# Patient Record
Sex: MALE | Race: WHITE | NOT HISPANIC OR LATINO | Employment: OTHER | URBAN - METROPOLITAN AREA
[De-identification: names, ages, dates, MRNs, and addresses within clinical notes are randomized per-mention and may not be internally consistent; named-entity substitution may affect disease eponyms.]

---

## 2023-09-03 PROBLEM — Z86.79 ATRIAL FIBRILLATION, CURRENTLY IN SINUS RHYTHM: Status: ACTIVE | Noted: 2023-09-03

## 2023-09-03 PROBLEM — B37.81 ESOPHAGEAL CANDIDIASIS (MULTI): Status: ACTIVE | Noted: 2023-09-03

## 2023-09-03 PROBLEM — I05.9 MITRAL VALVE DISORDER: Status: ACTIVE | Noted: 2023-09-03

## 2023-09-03 PROBLEM — K62.5 BRIGHT RED BLOOD PER RECTUM: Status: ACTIVE | Noted: 2023-09-03

## 2023-09-03 PROBLEM — T82.6XXA PROSTHETIC VALVE ENDOCARDITIS (CMS-HCC): Status: ACTIVE | Noted: 2023-09-03

## 2023-09-03 PROBLEM — N40.0 BENIGN ENLARGEMENT OF PROSTATE: Status: ACTIVE | Noted: 2023-09-03

## 2023-09-03 PROBLEM — B99.9 INFECTION: Status: ACTIVE | Noted: 2023-09-03

## 2023-09-03 PROBLEM — I33.0 BACTERIAL ENDOCARDITIS (HHS-HCC): Status: ACTIVE | Noted: 2023-09-03

## 2023-09-03 PROBLEM — H92.01 RIGHT EAR PAIN: Status: ACTIVE | Noted: 2023-09-03

## 2023-09-03 PROBLEM — I44.2 COMPLETE AV BLOCK (MULTI): Status: ACTIVE | Noted: 2023-09-03

## 2023-09-03 PROBLEM — J02.9 SORE THROAT: Status: ACTIVE | Noted: 2023-09-03

## 2023-09-03 PROBLEM — R05.9 COUGH: Status: ACTIVE | Noted: 2023-09-03

## 2023-09-03 PROBLEM — E78.5 HYPERLIPIDEMIA: Status: ACTIVE | Noted: 2023-09-03

## 2023-09-03 PROBLEM — Z95.2 PRESENCE OF PROSTHETIC HEART VALVE: Status: ACTIVE | Noted: 2023-09-03

## 2023-09-03 PROBLEM — I48.91 ATRIAL FIBRILLATION (MULTI): Status: ACTIVE | Noted: 2023-09-03

## 2023-09-03 PROBLEM — R09.89 BRUIT OF LEFT CAROTID ARTERY: Status: ACTIVE | Noted: 2023-09-03

## 2023-09-03 PROBLEM — Z95.0 PRESENCE OF CARDIAC PACEMAKER: Status: ACTIVE | Noted: 2023-09-03

## 2023-09-03 PROBLEM — Z95.2 S/P MVR (MITRAL VALVE REPLACEMENT): Status: ACTIVE | Noted: 2023-09-03

## 2023-09-03 PROBLEM — I38 PROSTHETIC VALVE ENDOCARDITIS (CMS-HCC): Status: ACTIVE | Noted: 2023-09-03

## 2023-09-03 PROBLEM — I33.0 PROSTHETIC VALVE ENDOCARDITIS: Status: ACTIVE | Noted: 2023-09-03

## 2023-09-03 PROBLEM — H61.22 IMPACTED CERUMEN OF LEFT EAR: Status: ACTIVE | Noted: 2023-09-03

## 2023-09-03 PROBLEM — N52.9 ERECTILE DYSFUNCTION: Status: ACTIVE | Noted: 2023-09-03

## 2023-09-03 RX ORDER — TIZANIDINE 2 MG/1
2 TABLET ORAL EVERY 6 HOURS PRN
COMMUNITY
Start: 2023-04-06 | End: 2024-05-20

## 2023-09-03 RX ORDER — FERROUS SULFATE 325(65) MG
325 TABLET ORAL
COMMUNITY
Start: 2021-02-22 | End: 2024-05-20

## 2023-09-03 RX ORDER — ACETAMINOPHEN 500 MG
TABLET ORAL
COMMUNITY
Start: 2022-10-04

## 2023-09-03 RX ORDER — WARFARIN 4 MG/1
TABLET ORAL
COMMUNITY
Start: 2021-02-03 | End: 2024-05-20

## 2023-09-03 RX ORDER — MULTIVIT-MIN/IRON FUM/FOLIC AC 7.5 MG-4
1 TABLET ORAL DAILY
COMMUNITY

## 2023-09-03 RX ORDER — ACETAMINOPHEN 325 MG/1
TABLET ORAL
COMMUNITY
End: 2024-05-20

## 2023-09-03 RX ORDER — ATORVASTATIN CALCIUM 20 MG/1
TABLET, FILM COATED ORAL
COMMUNITY
Start: 2022-08-31 | End: 2024-05-20

## 2023-11-09 ENCOUNTER — APPOINTMENT (OUTPATIENT)
Dept: SLEEP MEDICINE | Facility: HOSPITAL | Age: 68
End: 2023-11-09
Payer: MEDICARE

## 2023-12-05 ENCOUNTER — APPOINTMENT (OUTPATIENT)
Dept: SLEEP MEDICINE | Facility: HOSPITAL | Age: 68
End: 2023-12-05
Payer: COMMERCIAL

## 2023-12-05 ENCOUNTER — TELEPHONE (OUTPATIENT)
Dept: SLEEP MEDICINE | Facility: CLINIC | Age: 68
End: 2023-12-05
Payer: MEDICARE

## 2023-12-05 NOTE — TELEPHONE ENCOUNTER
krystle MONGE at [facility] the patient cancelled his appt [place] he has been rescheduled for May 2024

## 2024-04-11 ENCOUNTER — TELEPHONE (OUTPATIENT)
Dept: CARDIOLOGY | Facility: CLINIC | Age: 69
End: 2024-04-11
Payer: MEDICARE

## 2024-04-11 NOTE — TELEPHONE ENCOUNTER
Patients wife called the office today to see if you need lab work for Jose before he comes in for his appointment on 5/20?  There were no orders in his chart.  Please advise, thanks  Call back [phone]    Called patient with response.

## 2024-04-18 ENCOUNTER — TELEPHONE (OUTPATIENT)
Dept: SLEEP MEDICINE | Facility: CLINIC | Age: 69
End: 2024-04-18
Payer: MEDICARE

## 2024-04-18 DIAGNOSIS — G47.30 SLEEP APNEA, UNSPECIFIED TYPE: ICD-10-CM

## 2024-04-18 DIAGNOSIS — I38 PROSTHETIC VALVE ENDOCARDITIS, SEQUELA: ICD-10-CM

## 2024-04-18 DIAGNOSIS — T82.6XXS PROSTHETIC VALVE ENDOCARDITIS, SEQUELA: ICD-10-CM

## 2024-04-18 DIAGNOSIS — Z86.79 ATRIAL FIBRILLATION, CURRENTLY IN SINUS RHYTHM: Primary | ICD-10-CM

## 2024-04-18 NOTE — TELEPHONE ENCOUNTER
----- Message from Erica Hayden sent at 4/18/2024  7:49 AM EDT -----  Regarding: Sleep Study Order  This patient was scheduled in December for a sleep study on May 8, 2024  His order expires April 3, 2024.  Would you please place a new order.

## 2024-05-06 ENCOUNTER — APPOINTMENT (OUTPATIENT)
Dept: CARDIOLOGY | Facility: CLINIC | Age: 69
End: 2024-05-06
Payer: COMMERCIAL

## 2024-05-08 ENCOUNTER — APPOINTMENT (OUTPATIENT)
Dept: SLEEP MEDICINE | Facility: HOSPITAL | Age: 69
End: 2024-05-08
Payer: COMMERCIAL

## 2024-05-08 ENCOUNTER — CLINICAL SUPPORT (OUTPATIENT)
Dept: SLEEP MEDICINE | Facility: HOSPITAL | Age: 69
End: 2024-05-08
Payer: COMMERCIAL

## 2024-05-08 DIAGNOSIS — T82.6XXS PROSTHETIC VALVE ENDOCARDITIS, SEQUELA: ICD-10-CM

## 2024-05-08 DIAGNOSIS — G47.30 SLEEP APNEA, UNSPECIFIED TYPE: ICD-10-CM

## 2024-05-08 DIAGNOSIS — I38 PROSTHETIC VALVE ENDOCARDITIS, SEQUELA: ICD-10-CM

## 2024-05-08 DIAGNOSIS — G47.61 PERIODIC LIMB MOVEMENT DISORDER: ICD-10-CM

## 2024-05-08 DIAGNOSIS — Z86.79 ATRIAL FIBRILLATION, CURRENTLY IN SINUS RHYTHM: ICD-10-CM

## 2024-05-08 DIAGNOSIS — G47.33 OBSTRUCTIVE SLEEP APNEA (ADULT) (PEDIATRIC): ICD-10-CM

## 2024-05-08 PROCEDURE — 95810 POLYSOM 6/> YRS 4/> PARAM: CPT | Performed by: INTERNAL MEDICINE

## 2024-05-09 VITALS — HEART RATE: 50 BPM | RESPIRATION RATE: 14 BRPM | OXYGEN SATURATION: 98 %

## 2024-05-09 ASSESSMENT — SLEEP AND FATIGUE QUESTIONNAIRES
HOW LIKELY ARE YOU TO NOD OFF OR FALL ASLEEP WHILE WATCHING TV: MODERATE CHANCE OF DOZING
HOW LIKELY ARE YOU TO NOD OFF OR FALL ASLEEP WHILE SITTING QUIETLY AFTER LUNCH WITHOUT ALCOHOL: MODERATE CHANCE OF DOZING
HOW LIKELY ARE YOU TO NOD OFF OR FALL ASLEEP IN A CAR, WHILE STOPPED FOR A FEW MINUTES IN TRAFFIC: WOULD NEVER DOZE
HOW LIKELY ARE YOU TO NOD OFF OR FALL ASLEEP WHILE SITTING AND TALKING TO SOMEONE: WOULD NEVER DOZE
HOW LIKELY ARE YOU TO NOD OFF OR FALL ASLEEP WHILE LYING DOWN TO REST IN THE AFTERNOON WHEN CIRCUMSTANCES PERMIT: MODERATE CHANCE OF DOZING
HOW LIKELY ARE YOU TO NOD OFF OR FALL ASLEEP WHILE SITTING AND READING: WOULD NEVER DOZE
HOW LIKELY ARE YOU TO NOD OFF OR FALL ASLEEP WHEN YOU ARE A PASSENGER IN A CAR FOR AN HOUR WITHOUT A BREAK: WOULD NEVER DOZE
SITING INACTIVE IN A PUBLIC PLACE LIKE A CLASS ROOM OR A MOVIE THEATER: WOULD NEVER DOZE
ESS-CHAD TOTAL SCORE: 6

## 2024-05-16 NOTE — PROGRESS NOTES
[facility]  Followup Visit Note    HISTORY OF PRESENT ILLNESS   Jose Ann is a 69 y.o. male who presents to a [facility] for followup.       Current History    On today's visit, the patient reports ***      2023 (Giovani Pineda MD )  Obstructive Sleep Apnea:   Obstructive sleep apnea occurs as repetitive episodes of partial or complete blockage of the upper air way during sleep. During an episode, the muscles work harder to re-open the airway and blood pressure may go up.   Breathing usually resumes with a snort, loud gasp or body movement. Sleep apnea episode can interfere with getting quality sleep, cause heart rhythm problems, increases in blood pressure and reduce the flow of oxygen to vital organs such as the brain.   A sleep study confirms the diagnosis and tells us how many times you stop breathing in your sleep. Treatment aims to re-open the airway, with the most common being Positive Airway Pressure (PAP) delivered with a soft interface.   Other treatments include weight loss to remove excessive tissue around the neck, dental appliance, position (staying off the back) and surgery. Avoidance of sedatives such as alcohol, can help avoid worsening symptoms.   Adult Sleep Contact Information:   ** TO SCHEDULE APPOINTMENTS: Adult Central [phone].   ** FOR ALL OTHER SCHEDULING NEEDS (doctor offices, sleep study, after hours sleep study line): [phone])   Follow-Up:   Follow-Up Visit in 2 - 3 weeks after testing.     PAP Adherence:  Durable Medical Equipment Company: ***  Pressure Range: *** cm H2O Mask: ***    A PAP adherence download was obtained and data was reviewed personally today in clinic. (see scanned document in EPIC)  % days >4 hours use: ***  Average use : ***  95% pressure *** cm H2O  95% leak : *** L/min  AHI: ***    Sleep Scales:  ESS: 6     REVIEW OF SYSTEMS    All other systems  "negative      PHYSICAL EXAM     VITAL SIGNS: There were no vitals taken for this visit.     CURRENT WEIGHT: [unfilled]  BMI: [unfilled]  PREVIOUS WEIGHTS:  Wt Readings from Last 3 Encounters:   08/23/23 88.5 kg (195 lb)   06/08/23 92.5 kg (204 lb)   10/04/22 89.6 kg (197 lb 7 oz)       Constitutional: Alert and oriented, cooperative, no obvious distress   HEENT: Non icteric or anemic, EOM WNL bilaterally   Neck: Supple, no JVD, no goiter, no adenopathy, no rigidity  Extremities: No clubbing, no LL edema   Neuromuscular: Cranial nerves grossly intact, no focal deficits     RESULTS/DATA     No results found for: \"IRON\", \"TRANSFERRIN\", \"IRONSAT\", \"TIBC\", \"FERRITIN\"      ASSESSMENT/PLAN     Mr. Ann is a 69 y.o. male and returns in followup for the following issues:    ***    Follow up          "

## 2024-05-17 ENCOUNTER — APPOINTMENT (OUTPATIENT)
Dept: SLEEP MEDICINE | Facility: CLINIC | Age: 69
End: 2024-05-17
Payer: MEDICARE

## 2024-05-19 NOTE — PROGRESS NOTES
Subjective      Chief Complaint   Patient presents with    Follow-up          He has a history of mitral valve surgery in 2012 and 2020 he had COVID which did affect the valve which had to have repeat surgery for replacement.  In 2012 he had A-fib ablation.  Last catheterization in 2020 show the coronary arteries were normal.  He had been on statins in the past and had severe muscle aches.  He did have a cardiac calcium score in September of 2023 and it was 151.  Is active and is still working.  He is not complaining of chest discomfort.  NO PND or orthopnea.  The legs are not swelling on him.  He does not complain of palpitations.  Will have varicose veins injected.  The BP is doing well  Pacer is doing well  The last echo was last year and the valve is doing well           Review of Systems   Constitutional: Positive for fever. Negative for chills.   HENT: Negative.     Eyes: Negative.    Respiratory: Negative.  Negative for cough.    Endocrine: Negative.    Skin: Negative.    Musculoskeletal:  Positive for back pain and joint pain. Negative for falls.   Gastrointestinal: Negative.    Genitourinary: Negative.    Neurological: Negative.    All other systems reviewed and are negative.       Past Surgical History:   Procedure Laterality Date    OTHER SURGICAL HISTORY  06/04/2013    Isolation Of Intestinal Segment Large Intestine    OTHER SURGICAL HISTORY  08/20/2013    Maze Procedure    OTHER SURGICAL HISTORY  08/20/2013    Patent Foramen Ovale Closure    OTHER SURGICAL HISTORY  08/20/2013    Tricuspid Valve Repair    OTHER SURGICAL HISTORY  08/20/2013    Percutaneous Transcatheter Closure LA Appendage With Implant    OTHER SURGICAL HISTORY  08/20/2013    Mitral Valve Repair    OTHER SURGICAL HISTORY  08/20/2013    Treatment Of A Dislocated Toe IP Right    SMALL INTESTINE SURGERY  08/20/2013    Small Bowel Resection        Active Ambulatory Problems     Diagnosis Date Noted    Atrial fibrillation (Multi) 09/03/2023     Atrial fibrillation, currently in sinus rhythm 09/03/2023    Bacterial endocarditis ([facility]) 09/03/2023    Benign enlargement of prostate 09/03/2023    Bright red blood per rectum 09/03/2023    Bruit of left carotid artery 09/03/2023    Complete AV block (Multi) 09/03/2023    Cough 09/03/2023    Erectile dysfunction 09/03/2023    Esophageal candidiasis (Multi) 09/03/2023    Hyperlipidemia 09/03/2023    Impacted cerumen of left ear 09/03/2023    Infection 09/03/2023    Mitral valve disorder 09/03/2023    Presence of cardiac pacemaker 09/03/2023    Presence of prosthetic heart valve 09/03/2023    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   Eyes:      Pupils: Pupils are equal, round, and reactive to light.   Neck:      Vascular: JVD normal.   Pulmonary:      Breath sounds: Normal breath sounds.   Cardiovascular:      PMI at left midclavicular line. Normal rate. Regular rhythm. Normal S1. Normal S2.       Murmurs: There is a grade 2/6 holosystolic murmur.      No gallop.  No click. No rub.   Pulses:     Intact distal pulses.   Edema:     Peripheral edema absent.   Abdominal:      Palpations: Abdomen is soft.      Tenderness: There is no abdominal tenderness.   Musculoskeletal:      Extremities: No clubbing present.Skin:     General: Skin is warm and dry.   Neurological:      General: No focal deficit present.            Lab Review:         Lab Results   Component Value Date    CHOL  "204 (H) 12/02/2022    CHOL 184 08/29/2022    CHOL 189 12/08/2021     Lab Results   Component Value Date    HDL 43.5 12/02/2022    HDL 40.3 08/29/2022    HDL 40.1 12/08/2021     Lab Results   Component Value Date    LDLCALC 116 07/12/2019     Lab Results   Component Value Date    TRIG 57 12/02/2022    TRIG 52 08/29/2022    TRIG 61 12/08/2021     No components found for: \"CHOLHDL\"     Assessment/Plan     Mitral valve disorder  The valve sounds the same.  No angina and no chf.    Atrial fibrillation, currently in sinus rhythm  Remained in the NSR     "

## 2024-05-20 ENCOUNTER — OFFICE VISIT (OUTPATIENT)
Dept: CARDIOLOGY | Facility: CLINIC | Age: 69
End: 2024-05-20
Payer: MEDICARE

## 2024-05-20 VITALS
WEIGHT: 204 LBS | HEART RATE: 54 BPM | SYSTOLIC BLOOD PRESSURE: 138 MMHG | OXYGEN SATURATION: 97 % | BODY MASS INDEX: 28.45 KG/M2 | DIASTOLIC BLOOD PRESSURE: 61 MMHG

## 2024-05-20 DIAGNOSIS — I05.9 MITRAL VALVE DISORDER: Primary | ICD-10-CM

## 2024-05-20 DIAGNOSIS — Z86.79 ATRIAL FIBRILLATION, CURRENTLY IN SINUS RHYTHM: ICD-10-CM

## 2024-05-20 PROCEDURE — 99213 OFFICE O/P EST LOW 20 MIN: CPT | Performed by: INTERNAL MEDICINE

## 2024-05-20 PROCEDURE — 1036F TOBACCO NON-USER: CPT | Performed by: INTERNAL MEDICINE

## 2024-05-20 PROCEDURE — 1126F AMNT PAIN NOTED NONE PRSNT: CPT | Performed by: INTERNAL MEDICINE

## 2024-05-20 PROCEDURE — 1159F MED LIST DOCD IN RCRD: CPT | Performed by: INTERNAL MEDICINE

## 2024-05-20 ASSESSMENT — ENCOUNTER SYMPTOMS
LOSS OF SENSATION IN FEET: 0
EYES NEGATIVE: 1
OCCASIONAL FEELINGS OF UNSTEADINESS: 0
COUGH: 0
FALLS: 0
FEVER: 1
NEUROLOGICAL NEGATIVE: 1
BACK PAIN: 1
ENDOCRINE NEGATIVE: 1
CHILLS: 0
RESPIRATORY NEGATIVE: 1
DEPRESSION: 0
GASTROINTESTINAL NEGATIVE: 1

## 2024-05-20 ASSESSMENT — PATIENT HEALTH QUESTIONNAIRE - PHQ9
1. LITTLE INTEREST OR PLEASURE IN DOING THINGS: NOT AT ALL
SUM OF ALL RESPONSES TO PHQ9 QUESTIONS 1 AND 2: 0
2. FEELING DOWN, DEPRESSED OR HOPELESS: NOT AT ALL

## 2024-05-20 ASSESSMENT — PAIN SCALES - GENERAL: PAINLEVEL: 0-NO PAIN

## 2024-05-30 ENCOUNTER — LAB (OUTPATIENT)
Dept: LAB | Facility: LAB | Age: 69
End: 2024-05-30
Payer: MEDICARE

## 2024-05-30 ENCOUNTER — OFFICE VISIT (OUTPATIENT)
Dept: SLEEP MEDICINE | Facility: CLINIC | Age: 69
End: 2024-05-30
Payer: MEDICARE

## 2024-05-30 VITALS
WEIGHT: 200 LBS | DIASTOLIC BLOOD PRESSURE: 74 MMHG | HEIGHT: 71 IN | HEART RATE: 53 BPM | BODY MASS INDEX: 28 KG/M2 | OXYGEN SATURATION: 98 % | SYSTOLIC BLOOD PRESSURE: 110 MMHG

## 2024-05-30 DIAGNOSIS — E83.10 DISORDER OF IRON METABOLISM: ICD-10-CM

## 2024-05-30 DIAGNOSIS — G47.33 OBSTRUCTIVE SLEEP APNEA (ADULT) (PEDIATRIC): Primary | ICD-10-CM

## 2024-05-30 DIAGNOSIS — G47.61 PERIODIC LIMB MOVEMENT DISORDER: ICD-10-CM

## 2024-05-30 LAB
FERRITIN SERPL-MCNC: 117 NG/ML (ref 20–300)
IRON SATN MFR SERPL: 29 % (ref 25–45)
IRON SERPL-MCNC: 115 UG/DL (ref 35–150)
TIBC SERPL-MCNC: 397 UG/DL (ref 240–445)
UIBC SERPL-MCNC: 282 UG/DL (ref 110–370)

## 2024-05-30 PROCEDURE — 99214 OFFICE O/P EST MOD 30 MIN: CPT | Performed by: INTERNAL MEDICINE

## 2024-05-30 PROCEDURE — 1159F MED LIST DOCD IN RCRD: CPT | Performed by: INTERNAL MEDICINE

## 2024-05-30 PROCEDURE — 83540 ASSAY OF IRON: CPT

## 2024-05-30 PROCEDURE — 83550 IRON BINDING TEST: CPT

## 2024-05-30 PROCEDURE — 1126F AMNT PAIN NOTED NONE PRSNT: CPT | Performed by: INTERNAL MEDICINE

## 2024-05-30 PROCEDURE — 36415 COLL VENOUS BLD VENIPUNCTURE: CPT

## 2024-05-30 PROCEDURE — 1160F RVW MEDS BY RX/DR IN RCRD: CPT | Performed by: INTERNAL MEDICINE

## 2024-05-30 PROCEDURE — 1036F TOBACCO NON-USER: CPT | Performed by: INTERNAL MEDICINE

## 2024-05-30 PROCEDURE — 82728 ASSAY OF FERRITIN: CPT

## 2024-05-30 RX ORDER — GLUCOSAM/CHONDRO/HERB 149/HYAL 750-100 MG
TABLET ORAL
COMMUNITY

## 2024-05-30 ASSESSMENT — SLEEP AND FATIGUE QUESTIONNAIRES
HOW LIKELY ARE YOU TO NOD OFF OR FALL ASLEEP WHEN YOU ARE A PASSENGER IN A CAR FOR AN HOUR WITHOUT A BREAK: MODERATE CHANCE OF DOZING
HOW LIKELY ARE YOU TO NOD OFF OR FALL ASLEEP WHILE LYING DOWN TO REST IN THE AFTERNOON WHEN CIRCUMSTANCES PERMIT: HIGH CHANCE OF DOZING
HOW LIKELY ARE YOU TO NOD OFF OR FALL ASLEEP WHILE WATCHING TV: HIGH CHANCE OF DOZING
HOW LIKELY ARE YOU TO NOD OFF OR FALL ASLEEP WHILE SITTING QUIETLY AFTER LUNCH WITHOUT ALCOHOL: HIGH CHANCE OF DOZING
ESS-CHAD TOTAL SCORE: 18
HOW LIKELY ARE YOU TO NOD OFF OR FALL ASLEEP IN A CAR, WHILE STOPPED FOR A FEW MINUTES IN TRAFFIC: WOULD NEVER DOZE
SITING INACTIVE IN A PUBLIC PLACE LIKE A CLASS ROOM OR A MOVIE THEATER: MODERATE CHANCE OF DOZING
HOW LIKELY ARE YOU TO NOD OFF OR FALL ASLEEP WHILE SITTING AND TALKING TO SOMEONE: MODERATE CHANCE OF DOZING
HOW LIKELY ARE YOU TO NOD OFF OR FALL ASLEEP WHILE SITTING AND READING: HIGH CHANCE OF DOZING

## 2024-05-30 ASSESSMENT — PAIN SCALES - GENERAL: PAINLEVEL: 0-NO PAIN

## 2024-05-30 NOTE — ASSESSMENT & PLAN NOTE
We will check your ferritin level (a measure of iron stores) and start iron supplementation x 4-6 months if your ferritin level is under 75.

## 2024-05-30 NOTE — ASSESSMENT & PLAN NOTE
- The patient has sleep apnea and requires treatment.  - Start  Auto-PAP 5-15 cm H20 through "[employer].  - Sleep apnea and PAP therapy education was provided at length in clinic today. Jose  verbalized understanding.  -Jose verbalized understanding.

## 2024-05-30 NOTE — PROGRESS NOTES
Patient: Jose Ann    [id number]  : 1955 -- AGE 69 y.o.    Provider: Giovani Pineda MD     Location [facility]   Service Date: 2024              [facility]  Followup Visit Note        HISTORY OF PRESENT ILLNESS     The patient's referring provider is: Casey Ayala MD     HISTORY OF PRESENT ILLNESS   Jose Ann is a 69 y.o. male who presents to a [facility] for followup.     The patient  has a past medical history of Hyperlipidemia, Pacemaker, and S/P MVR (mitral valve repair)..    PAST SLEEP HISTORY  Suspect for SAIGE- ordered sleep study  Possible RLS    CURRENT HISTORY    On today's visit, the patient presents to review the results of his sleep study on 2024: PSG RDI 3% 29, RDI 4% 16.9, CHUCKIE 1.2, SpO2 archie 85%.  PLMI 61.5 with 2.8% associated with arousals.  Periodic limb movement arousal index 1.7.  Paced cardiac rhythm     RLS Followup:   Still has symptoms of RLS  Undergoing vein treatment    ESS: 18         REVIEW OF SYSTEMS     REVIEW OF SYSTEMS  Review of Systems      ALLERGIES AND MEDICATIONS     ALLERGIES  Allergies   Allergen Reactions    Ondansetron Hcl Unknown    Piperacillin-Tazobactam Unknown    Sulfamethoxazole-Trimethoprim Unknown     patient does not recall the reaction       MEDICATIONS: He has a current medication list which includes the following prescription(s): acetaminophen - 2 tablets by mouth  2 times daily as needed, multivit-min-iron fum-folic ac - Take 1 tablet by mouth once daily, and omega 3-dha-epa-fish oil - Take by mouth.    PAST MEDICAL HISTORY : He  has a past medical history of Hyperlipidemia, Pacemaker, and S/P MVR (mitral valve repair).    PAST SURGICAL HISTORY: He  has a past surgical history that includes Other surgical history (2013); Other surgical history (2013); Other surgical history (2013); Other surgical history (2013);  "Other surgical history (08/20/2013); Other surgical history (08/20/2013); Small intestine surgery (08/20/2013); and Other surgical history (08/20/2013).     FAMILY HISTORY: No changes since previous visit. Otherwise non-contributory as charted.       SOCIAL HISTORY  He  reports that he quit smoking about 4 years ago. His smoking use included cigarettes. He has never used smokeless tobacco. He reports current alcohol use. He reports that he does not use drugs.       PHYSICAL EXAM     VITAL SIGNS: /74   Pulse 53   Ht 1.803 m (5' 11\")   Wt 90.7 kg (200 lb)   SpO2 98%   BMI 27.89 kg/m²      CURRENT WEIGHT: [unfilled]  BMI: [unfilled]  PREVIOUS WEIGHTS:  Wt Readings from Last 3 Encounters:   05/30/24 90.7 kg (200 lb)   05/20/24 92.5 kg (204 lb)   08/23/23 88.5 kg (195 lb)       Physical Exam  PHYSICAL EXAM: GENERAL: alert pleasant and cooperative no acute distress  PSYCH EXAM: alert,oriented, in NAD with a full range of affect, normal behavior and no psychotic features      RESULTS/DATA     No results found for: \"IRON\", \"TRANSFERRIN\", \"IRONSAT\", \"TIBC\", \"FERRITIN\"    ASSESSMENT/PLAN     Mr. Ann is a 69 y.o. male and  has a past medical history of Hyperlipidemia, Pacemaker, and S/P MVR (mitral valve repair). He returns in followup to the [facility] for their sleep study results.    Problem List and Orders  Problem List Items Addressed This Visit             ICD-10-CM    Obstructive sleep apnea (adult) (pediatric) - Primary G47.33     - The patient has sleep apnea and requires treatment.  - Start  Auto-PAP 5-15 cm H20 through [employer].  - Sleep apnea and PAP therapy education was provided at length in clinic today. Jose  verbalized understanding.  -Jose verbalized understanding.          Relevant Orders    Positive Airway Pressure (PAP) Therapy    Periodic limb movement disorder G47.61     We will check your ferritin level (a measure of iron stores) and start iron " supplementation x 4-6 months if your ferritin level is under 75.           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

## 2024-05-30 NOTE — PATIENT INSTRUCTIONS
"  Starting Positive Airway Pressure: You were ordered a device to wear when you sleep called PAP (Positive Airway Pressure) to treat your sleep apnea. The order will be submitted to a durable medical equipment company who will arrange setting you up with the device. They will provide all the necessary equipment and discuss use and maintenance of the device with you.     Please followup with us in 1-2 months of starting PAP to see how well it is working for you or to troubleshoot. Please bring your equipment to this initial followup visit.    **Please bring all PAP equipment with you to follow up appointments unless told otherwise.**     Important things to keep in mind as you start PAP:  Insurance will monitor your usage during the first 90 days.  You should use your PAP - \"all night, every night\", for your health. The bare minimum is to use your PAP device while sleeping = at least 4 hours per day at least 5 days per week. Otherwise, your PAP device may be reclaimed by your PAP vendor at 90 days.  There are many mask to choose from to wear with your PAP machine. If you are not comfortable with the first mask issued to you, call your DME and ask for another option to try. Some have a 30 day return policy.  Discuss with your provider if you are having issues breathing with the machine or the temperature or humidity feel uncomfortable  Expect to have an adjustment period when you start your device. It helps to continuing wearing the machine every day for a period of time until you get more used to it. You can practice with wearing the mask alone if you need, then add in the PAP air pressure a few days later.   Reach out for help if you are struggling! The sleep medicine department can be reached at [phone]  We encourage you to download data monitoring apps to your phone. For [employer] 10/11 - MyAir tejas. For [employer] - DreamMapper. Both are available in the [employer] store for free and are a great " tool to monitor your progress with your CPAP night to night.

## 2024-06-20 ENCOUNTER — TELEPHONE (OUTPATIENT)
Dept: SLEEP MEDICINE | Facility: HOSPITAL | Age: 69
End: 2024-06-20
Payer: MEDICARE

## 2024-06-20 NOTE — TELEPHONE ENCOUNTER
Left message for pt regarding lab results. Advised to follow up with questions or concerns.

## 2024-07-25 ENCOUNTER — APPOINTMENT (OUTPATIENT)
Dept: SLEEP MEDICINE | Facility: CLINIC | Age: 69
End: 2024-07-25
Payer: MEDICARE

## 2024-08-28 ENCOUNTER — OFFICE VISIT (OUTPATIENT)
Dept: SLEEP MEDICINE | Facility: CLINIC | Age: 69
End: 2024-08-28
Payer: MEDICARE

## 2024-08-28 VITALS
WEIGHT: 189 LBS | DIASTOLIC BLOOD PRESSURE: 72 MMHG | OXYGEN SATURATION: 98 % | HEIGHT: 71 IN | HEART RATE: 54 BPM | BODY MASS INDEX: 26.46 KG/M2 | SYSTOLIC BLOOD PRESSURE: 120 MMHG

## 2024-08-28 DIAGNOSIS — G47.33 OBSTRUCTIVE SLEEP APNEA (ADULT) (PEDIATRIC): Primary | ICD-10-CM

## 2024-08-28 DIAGNOSIS — G47.61 PERIODIC LIMB MOVEMENT DISORDER: ICD-10-CM

## 2024-08-28 PROCEDURE — 3008F BODY MASS INDEX DOCD: CPT | Performed by: INTERNAL MEDICINE

## 2024-08-28 PROCEDURE — 1159F MED LIST DOCD IN RCRD: CPT | Performed by: INTERNAL MEDICINE

## 2024-08-28 PROCEDURE — 99214 OFFICE O/P EST MOD 30 MIN: CPT | Performed by: INTERNAL MEDICINE

## 2024-08-28 PROCEDURE — 1126F AMNT PAIN NOTED NONE PRSNT: CPT | Performed by: INTERNAL MEDICINE

## 2024-08-28 PROCEDURE — 1160F RVW MEDS BY RX/DR IN RCRD: CPT | Performed by: INTERNAL MEDICINE

## 2024-08-28 PROCEDURE — 1036F TOBACCO NON-USER: CPT | Performed by: INTERNAL MEDICINE

## 2024-08-28 ASSESSMENT — PAIN SCALES - GENERAL: PAINLEVEL: 0-NO PAIN

## 2024-08-28 ASSESSMENT — SLEEP AND FATIGUE QUESTIONNAIRES
ESS-CHAD TOTAL SCORE: 6
HOW LIKELY ARE YOU TO NOD OFF OR FALL ASLEEP WHILE WATCHING TV: SLIGHT CHANCE OF DOZING
HOW LIKELY ARE YOU TO NOD OFF OR FALL ASLEEP WHILE LYING DOWN TO REST IN THE AFTERNOON WHEN CIRCUMSTANCES PERMIT: MODERATE CHANCE OF DOZING
HOW LIKELY ARE YOU TO NOD OFF OR FALL ASLEEP WHEN YOU ARE A PASSENGER IN A CAR FOR AN HOUR WITHOUT A BREAK: WOULD NEVER DOZE
SITING INACTIVE IN A PUBLIC PLACE LIKE A CLASS ROOM OR A MOVIE THEATER: WOULD NEVER DOZE
HOW LIKELY ARE YOU TO NOD OFF OR FALL ASLEEP WHILE SITTING QUIETLY AFTER LUNCH WITHOUT ALCOHOL: MODERATE CHANCE OF DOZING
HOW LIKELY ARE YOU TO NOD OFF OR FALL ASLEEP WHILE SITTING AND TALKING TO SOMEONE: WOULD NEVER DOZE
HOW LIKELY ARE YOU TO NOD OFF OR FALL ASLEEP WHILE SITTING AND READING: SLIGHT CHANCE OF DOZING
HOW LIKELY ARE YOU TO NOD OFF OR FALL ASLEEP IN A CAR, WHILE STOPPED FOR A FEW MINUTES IN TRAFFIC: WOULD NEVER DOZE

## 2024-08-28 NOTE — ASSESSMENT & PLAN NOTE
- Jose Ann  has sleep apnea and requires treatment.  - Jose Ann demonstrates good compliance and benefit from PAP therapy  - Continue Auto PAP 5-15 cmH20   - Order for renewal of PAP supplies placed through [employer]  - Follow-up in 12 months

## 2024-08-28 NOTE — PROGRESS NOTES
"Subjective   Patient ID: Jose Ann is a 69 y.o. male who presents for Sleep Apnea and Pap Adherence Followup.  HPI    Prior Sleep History:  5/8/2024: PSG RDI 3% 29, RDI 4% 16.9, CHUCKIE 1.2, SpO2 archie 85%. PLMI 61.5 with 2.8% associated with arousals. Periodic limb movement arousal index 1.7. Paced cardiac rhythm     Current Sleep History:      A downloaded compliance report was reviewed and was interpreted by myself as follows:  > 4 hour compliance was 100 %, with an average use of  6 hours and 16 minutes, with a residual AHI 1.7 .   He reports that he is sleeping well.   Jose Ann reports  good benefit from his device.  Wears a nasal pillows mask    ESS: 6     Review of Systems  Review of systems negative except as per HPI  Objective   /72   Pulse 54   Ht 1.803 m (5' 11\")   Wt 85.7 kg (189 lb)   SpO2 98%   BMI 26.36 kg/m²    PREVIOUS WEIGHTS:  Wt Readings from Last 3 Encounters:   08/28/24 85.7 kg (189 lb)   05/30/24 90.7 kg (200 lb)   05/20/24 92.5 kg (204 lb)       Physical Exam  PHYSICAL EXAM: GENERAL: alert pleasant and cooperative no acute distress  PSYCH EXAM: alert,oriented, in NAD with a full range of affect, normal behavior and no psychotic features    Lab Results   Component Value Date    IRON 115 05/30/2024    TIBC 397 05/30/2024    FERRITIN 117 05/30/2024        Assessment/Plan   Problem List Items Addressed This Visit             ICD-10-CM    Obstructive sleep apnea (adult) (pediatric) - Primary G47.33     - Jose Ann  has sleep apnea and requires treatment.  - Jose Ann demonstrates good compliance and benefit from PAP therapy  - Continue Auto PAP 5-15 cmH20   - Order for renewal of PAP supplies placed through [employer]  - Follow-up in 12 months          Periodic limb movement disorder G47.61     Has not been problematic. Doing well                 "

## 2024-08-29 ENCOUNTER — APPOINTMENT (OUTPATIENT)
Dept: SLEEP MEDICINE | Facility: CLINIC | Age: 69
End: 2024-08-29
Payer: MEDICARE

## 2024-09-03 ENCOUNTER — HOSPITAL ENCOUNTER (OUTPATIENT)
Dept: RADIOLOGY | Facility: HOSPITAL | Age: 69
Discharge: HOME | End: 2024-09-03
Payer: MEDICARE

## 2024-09-03 DIAGNOSIS — R52 PAIN: ICD-10-CM

## 2024-09-03 DIAGNOSIS — S01.81XD LACERATION WITHOUT FOREIGN BODY OF OTHER PART OF HEAD, SUBSEQUENT ENCOUNTER: ICD-10-CM

## 2024-09-03 DIAGNOSIS — S09.90XD UNSPECIFIED INJURY OF HEAD, SUBSEQUENT ENCOUNTER: ICD-10-CM

## 2024-09-03 PROCEDURE — 76377 3D RENDER W/INTRP POSTPROCES: CPT

## 2024-09-03 PROCEDURE — 70486 CT MAXILLOFACIAL W/O DYE: CPT

## 2024-09-03 PROCEDURE — 70450 CT HEAD/BRAIN W/O DYE: CPT | Performed by: RADIOLOGY

## 2024-09-03 PROCEDURE — 70450 CT HEAD/BRAIN W/O DYE: CPT

## 2024-09-30 ENCOUNTER — TELEPHONE (OUTPATIENT)
Dept: CARDIOLOGY | Facility: CLINIC | Age: 69
End: 2024-09-30
Payer: MEDICARE

## 2024-10-03 ENCOUNTER — APPOINTMENT (OUTPATIENT)
Dept: CARDIOLOGY | Facility: CLINIC | Age: 69
End: 2024-10-03
Payer: MEDICARE

## 2024-11-01 ENCOUNTER — HOSPITAL ENCOUNTER (OUTPATIENT)
Dept: CARDIOLOGY | Facility: CLINIC | Age: 69
Discharge: HOME | End: 2024-11-01
Payer: MEDICARE

## 2024-11-01 DIAGNOSIS — I44.2 COMPLETE HEART BLOCK: ICD-10-CM

## 2024-11-01 PROCEDURE — 93280 PM DEVICE PROGR EVAL DUAL: CPT

## 2024-11-08 ENCOUNTER — HOSPITAL ENCOUNTER (OUTPATIENT)
Dept: CARDIOLOGY | Facility: CLINIC | Age: 69
Discharge: HOME | End: 2024-11-08
Payer: MEDICARE

## 2024-11-08 DIAGNOSIS — Z95.0 PRESENCE OF CARDIAC PACEMAKER: ICD-10-CM

## 2024-11-08 DIAGNOSIS — I44.2 CHB (COMPLETE HEART BLOCK): ICD-10-CM

## 2024-12-07 NOTE — PROGRESS NOTES
Subjective      Chief Complaint   Patient presents with    Follow-up          He has a history of mitral valve surgery in 2012 and 2020 he had COVID which did affect the valve which had to have repeat surgery for replacement.  In 2012 he had A-fib ablation.  Last catheterization in 2020 show the coronary arteries were normal.  He had been on statins in the past and had severe muscle aches.  He did have a cardiac calcium score in September of 2023 and it was 151.  Is doing well and is active.  He is not complaining of chest discomfort.  NO PND or orthopnea.  The legs are not swelling on him.  He does not complain of palpitations.  The BP is doing well  The pacer is doing well           ROS     Past Surgical History:   Procedure Laterality Date    OTHER SURGICAL HISTORY  06/04/2013    Isolation Of Intestinal Segment Large Intestine    OTHER SURGICAL HISTORY  08/20/2013    Maze Procedure    OTHER SURGICAL HISTORY  08/20/2013    Patent Foramen Ovale Closure    OTHER SURGICAL HISTORY  08/20/2013    Tricuspid Valve Repair    OTHER SURGICAL HISTORY  08/20/2013    Percutaneous Transcatheter Closure LA Appendage With Implant    OTHER SURGICAL HISTORY  08/20/2013    Mitral Valve Repair    OTHER SURGICAL HISTORY  08/20/2013    Treatment Of A Dislocated Toe IP Right    SMALL INTESTINE SURGERY  08/20/2013    Small Bowel Resection        Active Ambulatory Problems     Diagnosis Date Noted    Atrial fibrillation (Multi) 09/03/2023    Atrial fibrillation, currently in sinus rhythm 09/03/2023    Bacterial endocarditis ([facility]) 09/03/2023    Benign enlargement of prostate 09/03/2023    Bright red blood per rectum 09/03/2023    Bruit of left carotid artery 09/03/2023    Complete AV block (Multi) 09/03/2023    Cough 09/03/2023    Erectile dysfunction 09/03/2023    Esophageal candidiasis (Multi) 09/03/2023    Hyperlipidemia 09/03/2023    Impacted cerumen of left ear 09/03/2023    Infection 09/03/2023    Mitral valve disorder 09/03/2023     Presence of cardiac pacemaker 09/03/2023    Presence of prosthetic heart valve 09/03/2023    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   Eyes:      Pupils: Pupils are equal, round, and reactive to light.   Neck:      Vascular: No JVR. JVD normal.   Pulmonary:      Effort: Pulmonary effort is normal.      Breath sounds: Normal breath sounds.   Cardiovascular:      PMI at left midclavicular line. Normal rate. Regular rhythm. Normal S1. Normal S2.       Murmurs: There is a grade 2/6 holosystolic murmur.      No gallop.  No click. No rub.   Pulses:     Intact distal pulses.   Edema:     Peripheral edema absent.   Abdominal:      Palpations: Abdomen is soft.      Tenderness: There is no abdominal tenderness.   Musculoskeletal: Normal range of motion.      Extremities: No clubbing present.Skin:     General: Skin is warm and dry.   Neurological:      General: No focal deficit present.            Lab Review:         Lab Results   Component Value Date    CHOL 204 (H) 12/02/2022    CHOL 184 08/29/2022    CHOL 189 12/08/2021     Lab Results   Component Value Date    HDL 43.5 12/02/2022    HDL 40.3 08/29/2022    HDL 40.1 12/08/2021     Lab Results   Component Value Date    LDLCALC 116 07/12/2019     Lab Results   Component Value Date    TRIG 57 12/02/2022    TRIG 52 08/29/2022    TRIG 61 12/08/2021     No components found for:  "\"CHOLHDL\"     Assessment/Plan     Presence of prosthetic heart valve  He is doing well and is active.  No angina and no chf.  The valve sounds the same    Complete AV block (Multi)  The pacer is dong well     "

## 2024-12-09 ENCOUNTER — OFFICE VISIT (OUTPATIENT)
Dept: CARDIOLOGY | Facility: CLINIC | Age: 69
End: 2024-12-09
Payer: MEDICARE

## 2024-12-09 VITALS
BODY MASS INDEX: 27.62 KG/M2 | SYSTOLIC BLOOD PRESSURE: 138 MMHG | DIASTOLIC BLOOD PRESSURE: 84 MMHG | OXYGEN SATURATION: 99 % | WEIGHT: 198 LBS | HEART RATE: 60 BPM

## 2024-12-09 DIAGNOSIS — Z95.2 PRESENCE OF PROSTHETIC HEART VALVE: Primary | ICD-10-CM

## 2024-12-09 PROCEDURE — 99213 OFFICE O/P EST LOW 20 MIN: CPT | Performed by: INTERNAL MEDICINE

## 2024-12-09 PROCEDURE — 1159F MED LIST DOCD IN RCRD: CPT | Performed by: INTERNAL MEDICINE

## 2024-12-09 PROCEDURE — 1126F AMNT PAIN NOTED NONE PRSNT: CPT | Performed by: INTERNAL MEDICINE

## 2024-12-09 PROCEDURE — 1036F TOBACCO NON-USER: CPT | Performed by: INTERNAL MEDICINE

## 2024-12-09 ASSESSMENT — ENCOUNTER SYMPTOMS
OCCASIONAL FEELINGS OF UNSTEADINESS: 0
DEPRESSION: 0
LOSS OF SENSATION IN FEET: 0

## 2024-12-09 ASSESSMENT — PAIN SCALES - GENERAL: PAINLEVEL_OUTOF10: 0-NO PAIN

## 2024-12-09 ASSESSMENT — PATIENT HEALTH QUESTIONNAIRE - PHQ9
1. LITTLE INTEREST OR PLEASURE IN DOING THINGS: NOT AT ALL
2. FEELING DOWN, DEPRESSED OR HOPELESS: NOT AT ALL
SUM OF ALL RESPONSES TO PHQ9 QUESTIONS 1 AND 2: 0

## 2025-04-04 ENCOUNTER — ANCILLARY PROCEDURE (OUTPATIENT)
Facility: CLINIC | Age: 70
End: 2025-04-04
Payer: MEDICARE

## 2025-04-04 DIAGNOSIS — Z95.0 PRESENCE OF CARDIAC PACEMAKER: ICD-10-CM

## 2025-04-04 DIAGNOSIS — Z95.2 PRESENCE OF PROSTHETIC HEART VALVE: ICD-10-CM

## 2025-04-04 DIAGNOSIS — Z95.2 PRESENCE OF PROSTHETIC HEART VALVE: Primary | ICD-10-CM

## 2025-04-04 DIAGNOSIS — I44.2 CHB (COMPLETE HEART BLOCK): ICD-10-CM

## 2025-04-04 PROCEDURE — 93294 REM INTERROG EVL PM/LDLS PM: CPT | Performed by: INTERNAL MEDICINE

## 2025-04-04 PROCEDURE — 93296 REM INTERROG EVL PM/IDS: CPT

## 2025-05-16 ENCOUNTER — ANCILLARY PROCEDURE (OUTPATIENT)
Facility: CLINIC | Age: 70
End: 2025-05-16
Payer: MEDICARE

## 2025-05-16 DIAGNOSIS — I44.2 COMPLETE HEART BLOCK: ICD-10-CM

## 2025-05-16 DIAGNOSIS — I44.2 CHB (COMPLETE HEART BLOCK): ICD-10-CM

## 2025-05-16 DIAGNOSIS — Z95.2 PRESENCE OF PROSTHETIC HEART VALVE: Primary | ICD-10-CM

## 2025-05-16 DIAGNOSIS — Z95.0 PRESENCE OF CARDIAC PACEMAKER: ICD-10-CM

## 2025-05-16 DIAGNOSIS — Z95.2 PRESENCE OF PROSTHETIC HEART VALVE: ICD-10-CM

## 2025-05-23 DIAGNOSIS — I44.2 COMPLETE HEART BLOCK: ICD-10-CM

## 2025-05-23 DIAGNOSIS — I44.2 CHB (COMPLETE HEART BLOCK): ICD-10-CM

## 2025-05-23 DIAGNOSIS — Z95.0 PRESENCE OF CARDIAC PACEMAKER: ICD-10-CM

## 2025-05-23 DIAGNOSIS — Z95.2 PRESENCE OF PROSTHETIC HEART VALVE: Primary | ICD-10-CM

## 2025-05-29 ENCOUNTER — ANCILLARY PROCEDURE (OUTPATIENT)
Facility: CLINIC | Age: 70
End: 2025-05-29
Payer: MEDICARE

## 2025-05-29 DIAGNOSIS — I44.2 COMPLETE HEART BLOCK: ICD-10-CM

## 2025-05-29 PROCEDURE — 93280 PM DEVICE PROGR EVAL DUAL: CPT | Performed by: INTERNAL MEDICINE

## 2025-05-29 PROCEDURE — 93280 PM DEVICE PROGR EVAL DUAL: CPT

## 2025-06-26 ENCOUNTER — OFFICE VISIT (OUTPATIENT)
Facility: CLINIC | Age: 70
End: 2025-06-26
Payer: MEDICARE

## 2025-06-26 VITALS
SYSTOLIC BLOOD PRESSURE: 110 MMHG | HEART RATE: 64 BPM | WEIGHT: 193.5 LBS | HEIGHT: 71 IN | DIASTOLIC BLOOD PRESSURE: 60 MMHG | BODY MASS INDEX: 27.09 KG/M2

## 2025-06-26 DIAGNOSIS — Z95.2 S/P MVR (MITRAL VALVE REPLACEMENT): ICD-10-CM

## 2025-06-26 DIAGNOSIS — Z95.0 PRESENCE OF CARDIAC PACEMAKER: Primary | ICD-10-CM

## 2025-06-26 DIAGNOSIS — I48.0 PAROXYSMAL ATRIAL FIBRILLATION (MULTI): ICD-10-CM

## 2025-06-26 PROCEDURE — 3008F BODY MASS INDEX DOCD: CPT | Performed by: INTERNAL MEDICINE

## 2025-06-26 PROCEDURE — 99213 OFFICE O/P EST LOW 20 MIN: CPT | Performed by: INTERNAL MEDICINE

## 2025-06-26 PROCEDURE — 1036F TOBACCO NON-USER: CPT | Performed by: INTERNAL MEDICINE

## 2025-06-26 PROCEDURE — 1159F MED LIST DOCD IN RCRD: CPT | Performed by: INTERNAL MEDICINE

## 2025-06-26 PROCEDURE — 99212 OFFICE O/P EST SF 10 MIN: CPT

## 2025-06-26 PROCEDURE — G2211 COMPLEX E/M VISIT ADD ON: HCPCS | Performed by: INTERNAL MEDICINE

## 2025-06-26 ASSESSMENT — ENCOUNTER SYMPTOMS
DEPRESSION: 0
COUGH: 0
SHORTNESS OF BREATH: 0
NUMBNESS: 0
PARESTHESIAS: 0
LOSS OF SENSATION IN FEET: 0
DYSPNEA ON EXERTION: 0
ABDOMINAL PAIN: 0
PALPITATIONS: 0
OCCASIONAL FEELINGS OF UNSTEADINESS: 0
HEMATURIA: 0
DYSURIA: 0
BLURRED VISION: 0

## 2025-06-26 ASSESSMENT — PATIENT HEALTH QUESTIONNAIRE - PHQ9
2. FEELING DOWN, DEPRESSED OR HOPELESS: NOT AT ALL
SUM OF ALL RESPONSES TO PHQ9 QUESTIONS 1 AND 2: 0
1. LITTLE INTEREST OR PLEASURE IN DOING THINGS: NOT AT ALL

## 2025-06-26 NOTE — ASSESSMENT & PLAN NOTE
Records are somewhat unclear as whether the patient had a mitral valve repair for mitral valve replacement.  Will order echocardiogram to assess status of mitral valve, he will do it prior to his follow-up visit and review with myself.  Clinically though he is doing very well with no unusual shortness of breath and good physical activity.  Unfortunately wife has been diagnosed with frontotemporal dementia and this has been challenging.

## 2025-06-26 NOTE — PROGRESS NOTES
Subjective   Jose Ann is a 70 y.o. male.    Chief Complaint:  Follow-up    HPI  70-year-old male, former patient of Dr. Mendez, with a history of valvular heart disease reports for cardiology follow-up visit.  The patient had a mitral valve repair and tricuspid valve repair back in August 2013 with Dr. Coyne.  He did have a heart catheterization done in 2020 revealing normal-appearing coronary arteries.  He also has a pacemaker followed by Dr. Peterson.  He states he is doing very well at this time.  Describes no chest pain or anginal symptoms.  No unusual shortness of breath.  The patient's wife has been diagnosed with frontotemporal dementia and now receiving care at the [facility].    Review of Systems   Constitutional: Negative for malaise/fatigue.   HENT:  Negative for congestion.    Eyes:  Negative for blurred vision.   Cardiovascular:  Negative for chest pain, dyspnea on exertion and palpitations.   Respiratory:  Negative for cough and shortness of breath.    Musculoskeletal:  Negative for joint pain.   Gastrointestinal:  Negative for abdominal pain.   Genitourinary:  Negative for dysuria and hematuria.   Neurological:  Negative for numbness and paresthesias.       Objective   Constitutional:       Appearance: Not in distress.   Eyes:      Conjunctiva/sclera: Conjunctivae normal.   Neck:      Vascular: JVD normal.   Pulmonary:      Breath sounds: Normal breath sounds. No wheezing. No rhonchi. No rales.   Cardiovascular:      Normal rate. Regular rhythm.      Murmurs: There is no murmur.      No gallop.  No click. No rub.   Abdominal:      Palpations: Abdomen is soft.   Neurological:      General: No focal deficit present.      Mental Status: Alert.         Lab Review:       Assessment/Plan   The primary encounter diagnosis was Presence of cardiac pacemaker. Diagnoses of Paroxysmal atrial fibrillation (Multi) and S/P MVR (mitral valve replacement) were also pertinent to this  visit.    Presence of cardiac pacemaker  Dr. Peterson follows pacemaker    S/P MVR (mitral valve replacement)  Records are somewhat unclear as whether the patient had a mitral valve repair for mitral valve replacement.  Will order echocardiogram to assess status of mitral valve, he will do it prior to his follow-up visit and review with myself.  Clinically though he is doing very well with no unusual shortness of breath and good physical activity.  Unfortunately wife has been diagnosed with frontotemporal dementia and this has been challenging.    Atrial fibrillation (Multi)  Ablation procedure done in 2012 and has had no symptomatic recurrences.  He is not on any cardiac medications including no anticoagulants.  I have ordered an echocardiogram to review cardiac chamber sizes as well as status of mitral and tricuspid valves.  Not clear as to why he is not on an anticoagulant.  Will review what records I can find and discuss further on patient's follow-up.

## 2025-06-26 NOTE — ASSESSMENT & PLAN NOTE
Ablation procedure done in 2012 and has had no symptomatic recurrences.  He is not on any cardiac medications including no anticoagulants.  I have ordered an echocardiogram to review cardiac chamber sizes as well as status of mitral and tricuspid valves.  Not clear as to why he is not on an anticoagulant.  Will review what records I can find and discuss further on patient's follow-up.

## 2025-07-18 ENCOUNTER — HOSPITAL ENCOUNTER (OUTPATIENT)
Dept: RADIOLOGY | Facility: HOSPITAL | Age: 70
Discharge: HOME | End: 2025-07-18
Payer: MEDICARE

## 2025-07-18 DIAGNOSIS — M25.562 PAIN IN LEFT KNEE: ICD-10-CM

## 2025-07-18 PROCEDURE — 73564 X-RAY EXAM KNEE 4 OR MORE: CPT | Mod: LT

## 2025-08-15 ENCOUNTER — ANCILLARY PROCEDURE (OUTPATIENT)
Facility: CLINIC | Age: 70
End: 2025-08-15
Payer: MEDICARE

## 2025-08-15 DIAGNOSIS — Z95.0 PRESENCE OF CARDIAC PACEMAKER: ICD-10-CM

## 2025-08-15 DIAGNOSIS — I44.2 COMPLETE HEART BLOCK: ICD-10-CM

## 2025-08-15 DIAGNOSIS — I48.0 PAROXYSMAL ATRIAL FIBRILLATION (MULTI): ICD-10-CM

## 2025-08-15 DIAGNOSIS — Z95.0 PRESENCE OF CARDIAC PACEMAKER: Primary | ICD-10-CM

## 2025-08-15 PROCEDURE — 93294 REM INTERROG EVL PM/LDLS PM: CPT | Performed by: INTERNAL MEDICINE

## 2025-08-15 PROCEDURE — 93296 REM INTERROG EVL PM/IDS: CPT

## 2025-08-28 ENCOUNTER — APPOINTMENT (OUTPATIENT)
Dept: UROLOGY | Facility: CLINIC | Age: 70
End: 2025-08-28
Payer: MEDICARE

## 2025-08-28 VITALS — HEIGHT: 71 IN | TEMPERATURE: 97.8 F | BODY MASS INDEX: 26.99 KG/M2

## 2025-08-28 DIAGNOSIS — R97.20 ELEVATED PSA: ICD-10-CM

## 2025-08-28 DIAGNOSIS — N40.0 BENIGN PROSTATIC HYPERPLASIA WITHOUT LOWER URINARY TRACT SYMPTOMS: Primary | ICD-10-CM

## 2025-08-28 PROCEDURE — G2211 COMPLEX E/M VISIT ADD ON: HCPCS | Performed by: UROLOGY

## 2025-08-28 PROCEDURE — 1126F AMNT PAIN NOTED NONE PRSNT: CPT | Performed by: UROLOGY

## 2025-08-28 PROCEDURE — 1036F TOBACCO NON-USER: CPT | Performed by: UROLOGY

## 2025-08-28 PROCEDURE — 99204 OFFICE O/P NEW MOD 45 MIN: CPT | Performed by: UROLOGY

## 2025-08-28 PROCEDURE — 1160F RVW MEDS BY RX/DR IN RCRD: CPT | Performed by: UROLOGY

## 2025-08-28 PROCEDURE — 1159F MED LIST DOCD IN RCRD: CPT | Performed by: UROLOGY

## 2025-08-28 ASSESSMENT — PAIN SCALES - GENERAL: PAINLEVEL_OUTOF10: 0-NO PAIN

## 2025-09-04 LAB — PSA SERPL-MCNC: 5.9 NG/ML
